# Patient Record
(demographics unavailable — no encounter records)

---

## 2025-02-13 NOTE — HISTORY OF PRESENT ILLNESS
[FreeTextEntry1] :  Language Line 's Name: No 's ID #: [id number] Language: [place] Ms. THUAN RYAN is a 41 year old female PMH of GERD and rectal bleeding (2020). PSH: abdominoplasty ("years ago")   Presenting for one month of mid epigastric burning, abdominal bloating and globus sensation. Took omeprazole for a few days three weeks ago without relief of symptoms.  Abdominal bloating is not relieved with BM and worsens after meals. Reported prior h/o of h. pylori in the past, which was treated with abx.   Denies change in bowel habits, constipation, N/V/D, rectal bleeding, abdominal pain, unintentional weight loss, early satiety, dysphagia, hematochezia or melena. BM every 1-2 days; soft, brown stools that are easily passed.  Last seen by Dr. Padilla 10/2023 reporting post prandial vomiting, early satiety, and heartburn. Underwent gastric emptying study, which was normal.  Also completed colonoscopy 1/21 for acute rectal bleeding and EGD 9/16/19 for GERD with Dr. Padilla. -EGD normal, no h. pylori, normal biopsies. -colonoscopy: internal hemorrhoids, no biopsies, recall 10  yrs.  [de-identified] : 9/16/19 Findings: Consent was obtained from the patient prior to the procedure. Risks, benefits, and alternatives were discussed including not limited to bleeding, infection, perforation, missed lesions, anesthesia complications. The patient understood and agreed, all questions were answered.  The GIF-190 upper endoscope was advanced through the mouth to the second portion of the duodenum. The examined duodenum appeared normal.  Biopsies were taken.  The stomach contained an antral erosion with surrounding erythema. Antral and gastric body biopsies were taken. The esophagus and GE junction appeared normal.  Distal esophageal biopsies were taken.  The patient tolerated the procedure without difficulty or complication. Follow-Up Plan: Impression: 1. Erosive gastritis A. Duodenum, biopsy: Duodenal mucosa showing no significant histopathologic changes.  B. Stomach antrum, biopsy: Gastric mucosa, antral type, showing mild chronic and focal acute inflammation.    No Helicobacter-like organisms identified on Giemsa stain.  C. Stomach body, biopsy: Gastric mucosa, oxyntic type, showing mild chronic inflammation with a lymphoid aggregate. No Helicobacter-like organisms identified on Giemsa stain.  D. Distal esophagus, biopsy: Esophageal squamous mucosa showing no significant histopathologic changes. [de-identified] : Exam Date: 	  11/08/23					 Exam: 	[facility] GASTRIC EMPTYING STUDY					 Order#:	[id number]					                HISTORY: Early satiety, vomiting; evaluate for gastroparesis  After the oral administration of 0.51 mCi Tc 99m sulfur-colloid labeled to 4 ounces of scrambled liquid egg whites and ingested with 2 slices of white toast, 28 g of jelly, and 4 ounces of water, simultaneous anterior and posterior imaging and data acquisition was performed up to 180 minutes. [id number] computer quantitation was performed of activity in the stomach.  Comparison is made to ultrasound abdomen of 2/19/2018 and CT abdomen/pelvis 12/4/2017.   IMPRESSION: The stomach is qualitatively grossly normal in size and configuration. Quantitation fails to demonstrate any evidence of gastroparesis, with an estimated T-1/2 of gastric emptying of 102 minutes (raw data)/93 minutes (linear fit) and with 29% gastric retention evident at 2 hours and 3% gastric retention evident at 3 hours.

## 2025-02-13 NOTE — ASSESSMENT
[FreeTextEntry1] : One month of abdominal bloating, heartburn, and globus sensation. - f/u h. pylori stool testing. - start 40mg pantoprazole daily x 6-8 weeks after submitting stool test. - RTC in 8 weeks to discuss symptom response.   Reviewed lifestyle modifications such as a weight loss and dietary chances. Avoid or limit foods that cause symptoms, which may be excessive caffeine, chocolate, alcohol, peppermint, red sauce, spicy foods and fatty foods. Avoid late night eating and snacking. Avoid large meals. -Use OTC antacids/alginates for intermittent symptoms of reflux such as Tums, Reflux Gourmet or Gaviscon. - Start famotidine 10mg daily until PPI reaches therapeutic effect in about 5-7 days, then can continue PRN.